# Patient Record
Sex: MALE | Race: WHITE | NOT HISPANIC OR LATINO | Employment: OTHER | URBAN - METROPOLITAN AREA
[De-identification: names, ages, dates, MRNs, and addresses within clinical notes are randomized per-mention and may not be internally consistent; named-entity substitution may affect disease eponyms.]

---

## 2023-05-19 ENCOUNTER — OFFICE VISIT (OUTPATIENT)
Dept: URGENT CARE | Facility: CLINIC | Age: 83
End: 2023-05-19
Payer: MEDICARE

## 2023-05-19 VITALS
HEART RATE: 54 BPM | BODY MASS INDEX: 20.04 KG/M2 | SYSTOLIC BLOOD PRESSURE: 128 MMHG | DIASTOLIC BLOOD PRESSURE: 68 MMHG | HEIGHT: 70 IN | TEMPERATURE: 97 F | OXYGEN SATURATION: 95 % | RESPIRATION RATE: 16 BRPM | WEIGHT: 140 LBS

## 2023-05-19 DIAGNOSIS — R53.1 GENERALIZED WEAKNESS: Primary | ICD-10-CM

## 2023-05-19 DIAGNOSIS — R82.4 KETONURIA: ICD-10-CM

## 2023-05-19 DIAGNOSIS — R23.0 PERIPHERAL CYANOSIS: ICD-10-CM

## 2023-05-19 DIAGNOSIS — R00.1 BRADYCARDIA: ICD-10-CM

## 2023-05-19 DIAGNOSIS — Z87.440 HISTORY OF RECURRENT UTI (URINARY TRACT INFECTION): ICD-10-CM

## 2023-05-19 DIAGNOSIS — R19.7 ACUTE DIARRHEA: ICD-10-CM

## 2023-05-19 DIAGNOSIS — M62.81 ABNORMAL GAIT DUE TO MUSCLE WEAKNESS: ICD-10-CM

## 2023-05-19 DIAGNOSIS — R53.81 DECLINING FUNCTIONAL STATUS: ICD-10-CM

## 2023-05-19 DIAGNOSIS — R30.0 DYSURIA: ICD-10-CM

## 2023-05-19 DIAGNOSIS — R53.83 FATIGUE, UNSPECIFIED TYPE: ICD-10-CM

## 2023-05-19 DIAGNOSIS — R53.81 PHYSICAL DECONDITIONING: ICD-10-CM

## 2023-05-19 DIAGNOSIS — E46 MALNUTRITION, UNSPECIFIED TYPE: ICD-10-CM

## 2023-05-19 DIAGNOSIS — R26.9 ABNORMAL GAIT DUE TO MUSCLE WEAKNESS: ICD-10-CM

## 2023-05-19 DIAGNOSIS — Z86.2 HISTORY OF ANEMIA: ICD-10-CM

## 2023-05-19 DIAGNOSIS — E88.89 KETOSIS: ICD-10-CM

## 2023-05-19 DIAGNOSIS — D50.8 IRON DEFICIENCY ANEMIA SECONDARY TO INADEQUATE DIETARY IRON INTAKE: ICD-10-CM

## 2023-05-19 LAB
BILIRUB UR QL STRIP: NEGATIVE
GLUCOSE UR QL STRIP: NEGATIVE
HGB, POC: 11.7 G/DL (ref 14–18)
KETONES UR QL STRIP: POSITIVE
LEUKOCYTE ESTERASE UR QL STRIP: NEGATIVE
PH, POC UA: 7
POC BLOOD, URINE: NEGATIVE
POC NITRATES, URINE: NEGATIVE
PROT UR QL STRIP: NEGATIVE
SP GR UR STRIP: 1 (ref 1–1.03)
UROBILINOGEN UR STRIP-ACNC: NORMAL (ref 0.3–2.2)

## 2023-05-19 PROCEDURE — 81003 URINALYSIS AUTO W/O SCOPE: CPT | Mod: QW,S$GLB,, | Performed by: NURSE PRACTITIONER

## 2023-05-19 PROCEDURE — 81003 POCT URINALYSIS, DIPSTICK, AUTOMATED, W/O SCOPE: ICD-10-PCS | Mod: QW,S$GLB,, | Performed by: NURSE PRACTITIONER

## 2023-05-19 PROCEDURE — 99204 OFFICE O/P NEW MOD 45 MIN: CPT | Mod: S$GLB,,, | Performed by: NURSE PRACTITIONER

## 2023-05-19 PROCEDURE — 93005 EKG 12-LEAD: ICD-10-PCS | Mod: S$GLB,,, | Performed by: NURSE PRACTITIONER

## 2023-05-19 PROCEDURE — 99204 PR OFFICE/OUTPT VISIT, NEW, LEVL IV, 45-59 MIN: ICD-10-PCS | Mod: S$GLB,,, | Performed by: NURSE PRACTITIONER

## 2023-05-19 PROCEDURE — 93010 EKG 12-LEAD: ICD-10-PCS | Mod: S$GLB,,, | Performed by: INTERNAL MEDICINE

## 2023-05-19 PROCEDURE — 93010 ELECTROCARDIOGRAM REPORT: CPT | Mod: S$GLB,,, | Performed by: INTERNAL MEDICINE

## 2023-05-19 PROCEDURE — 85018 HEMOGLOBIN: CPT | Mod: QW,,, | Performed by: NURSE PRACTITIONER

## 2023-05-19 PROCEDURE — 85018 POCT HEMOGLOBIN: ICD-10-PCS | Mod: QW,,, | Performed by: NURSE PRACTITIONER

## 2023-05-19 PROCEDURE — 93005 ELECTROCARDIOGRAM TRACING: CPT | Mod: S$GLB,,, | Performed by: NURSE PRACTITIONER

## 2023-05-19 RX ORDER — SIMVASTATIN 20 MG/1
TABLET, FILM COATED ORAL
COMMUNITY
Start: 2023-05-15

## 2023-05-19 RX ORDER — DIPHENOXYLATE HYDROCHLORIDE AND ATROPINE SULFATE 2.5; .025 MG/1; MG/1
1 TABLET ORAL 4 TIMES DAILY PRN
COMMUNITY
Start: 2023-05-18

## 2023-05-19 NOTE — PROGRESS NOTES
"Subjective:      Patient ID: Terrance Levin is a 82 y.o. male.    Vitals:  height is 5' 10" (1.778 m) and weight is 63.5 kg (140 lb). His temperature is 97.3 °F (36.3 °C). His blood pressure is 128/68 and his pulse is 54 (abnormal). His respiration is 16 and oxygen saturation is 95%.     Chief Complaint: Dysuria      Pt presents to urgent care clinic today accompanied by his wife. He is in [place] visiting family; he resides in [place]. Wife states she brought him in today to be checked for UTI since "I think he has another UTI because he's been having diarrhea and he's been sleeping a lot lately. He always gets like that when he has a UTI". Pt denies current dysuria, frequency, hematuria, or flank pain.  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  Last pcp records reveal prescribed daily prophylactic antibiotic, Macrodantin 50 mg daily, however patient and wife deny current daily abx use. Prior records review reveals past prescriptions for Ferrous Sulfate and furosemide which he denies actively taking.    C/o watery stools for the past week; averaging 1-2 diarrhea stools daily. He does report sufficient po water intake of ~72 oz daily. He denies poor appetite, however he has not eaten solid foods in the past 1-2 weeks as his wife has been serving him a liquid diet, mainly broth "because of the diarrhea" .  Pt reports generalized weakness, lightheadedness, dizziness which is more pronounced with positional changes. His physical condition has acutely progressed with new loss of functional independence whereas he is no longer able to ambulate without assistance as of the past 1-2 days.   He denies fever, vomiting, abdominal pain, rectal bleeding/epistaxis, CP, dyspnea.  He reports "wet" cough for the past 2 months " which is infrequently productive of white/frothy sputum without presence of hemoptysis.  Wife reports ~40# weight loss in the past 6 months.      Dysuria   This is a new problem. The current episode started 1 to 4 weeks ago. The problem occurs intermittently. The problem has been gradually worsening. Quality: denies pain. The pain is at a severity of 0/10. The patient is experiencing no pain. There has been no fever. He is Not sexually active. There is No history of pyelonephritis. Associated symptoms include chills, frequency, urgency and weight loss. Pertinent negatives include no flank pain, hematuria, nausea, sweats, vomiting or rash. He has tried nothing for the symptoms. His past medical history is significant for recurrent UTIs.     Constitution: Positive for activity change, chills, fatigue, unexpected weight change and generalized weakness. Negative for appetite change and fever.        Wife reports ~40# weight loss in past 6 months   HENT:  Negative for ear pain, tinnitus, hearing loss, mouth sores, congestion, nosebleeds, sinus pressure, sore throat, trouble swallowing and voice change.    Neck: Negative for neck pain, neck stiffness, painful lymph nodes and neck swelling.   Cardiovascular:  Negative for chest pain, leg swelling, palpitations, sob on exertion and passing out.   Eyes:  Negative for vision loss, double vision and blurred vision.   Respiratory:  Positive for cough and sputum production. Negative for chest tightness, bloody sputum, shortness of breath and wheezing.         Wet cough x 2 months  Infrequently productive of frothy white sputum   Denies dyspnea on exertion or at rest  Denies orthopnea, however he reports sleeping on more pillows lately from 1 to ~4 pillows.  Most recent BNP of 1128 on 2/28/23, however he denies PmHx CHF   Gastrointestinal:  Positive for diarrhea. Negative for abdominal pain, abdominal bloating, nausea, vomiting, bright red blood in stool, dark colored stools,  rectal bleeding and rectal pain.   Genitourinary:  Positive for dysuria, frequency, urgency and bladder incontinence. Negative for urine decreased, flank pain, hematuria, testicular pain and pelvic pain.   Musculoskeletal:  Positive for muscle ache. Negative for pain, joint pain, joint swelling and pain with walking.   Skin:  Negative for color change, pale, rash, wound and bruising.   Neurological:  Positive for dizziness, light-headedness, loss of balance and tremors. Negative for history of vertigo, passing out, speech difficulty, headaches, disorientation, altered mental status, loss of consciousness and seizures.   Hematologic/Lymphatic: Negative for swollen lymph nodes and easy bruising/bleeding. Does not bruise/bleed easily.   Psychiatric/Behavioral:  Negative for altered mental status, disorientation, confusion, agitation and nervous/anxious. The patient is not nervous/anxious.     Objective:     Physical Exam   Constitutional: He is oriented to person, place, and time. He appears cachectic. He is cooperative.  Non-toxic appearance. No distress. awake  HENT:   Head: Normocephalic and atraumatic.   Ears:   Right Ear: External ear normal.   Left Ear: External ear normal.   Nose: Nose normal.   Mouth/Throat: Mucous membranes are dry. No oropharyngeal exudate or posterior oropharyngeal erythema.   Eyes: Pupils are equal, round, and reactive to light. Right eye exhibits no discharge. Left eye exhibits no discharge. No scleral icterus. Extraocular movement intact      Comments: Conjunctival pallor bilaterally   Neck: Neck supple.      Comments: R jaw/neck deformity 2/2 prior tumor excision No neck rigidity present.   Cardiovascular: Regular rhythm. Bradycardia present.   No murmur heard.     Comments: Distant heart sounds noted   Pulmonary/Chest: No accessory muscle usage. No tachypnea and no bradypnea. No respiratory distress. He has decreased breath sounds. He has no wheezes. He has no rales.         Comments:  Shallow respirations noted  Wet cough present without sputum production. Pt is unable to expectorate sputum    Abdominal: Bowel sounds are normal. He exhibits no distension and no mass. Soft. flat abdomen There is no abdominal tenderness. There is no guarding, no left CVA tenderness and no right CVA tenderness.   Musculoskeletal:         General: No swelling or deformity.      Cervical back: He exhibits no tenderness.      Right lower leg: No edema.      Left lower leg: No edema.   Lymphadenopathy:     He has no cervical adenopathy.   Neurological: no focal deficit. He is alert, oriented to person, place, and time and at baseline. He displays weakness and tremor. He displays facial symmetry.      Comments: Gait not tested due to reported unsteadiness.  Wife confirms mental status is at baseline   Skin: Skin is dry, not diaphoretic, pale and no rash. Capillary refill takes 2 to 3 seconds. No bruising cyanosis         Comments: Skin cool to touch  Bluish/purple nail beds jaundice  Psychiatric: His behavior is normal. Mood, judgment and thought content normal. Cognition normal  Nursing note and vitals reviewed.chaperone present (wife present at bedside)     Assessment:     1. Generalized weakness    2. Iron deficiency anemia secondary to inadequate dietary iron intake    3. Malnutrition, unspecified type    4. Ketonuria    5. Acute diarrhea    6. Declining functional status    7. Bradycardia    8. Peripheral cyanosis    9. Abnormal gait due to muscle weakness    10. Physical deconditioning    11. Fatigue, unspecified type    12. History of recurrent UTI (urinary tract infection)    13. History of anemia    14. Dysuria    15. Ketosis        Plan:       Generalized weakness  -     Urine culture  -     POCT HEMOGLOBIN  -     IN OFFICE EKG 12-LEAD (to [place])  -     Refer to Emergency Dept.    Iron deficiency anemia secondary to inadequate dietary iron intake    Malnutrition, unspecified type    Ketonuria    Acute  diarrhea  -     Refer to Emergency Dept.    Declining functional status    Bradycardia  -     IN OFFICE EKG 12-LEAD (to [place])  -     Refer to Emergency Dept.    Peripheral cyanosis    Abnormal gait due to muscle weakness    Physical deconditioning  -     Refer to Emergency Dept.    Fatigue, unspecified type  -     IN OFFICE EKG 12-LEAD (to [place])  -     Refer to Emergency Dept.    History of recurrent UTI (urinary tract infection)  -     Urine culture  -     Refer to Emergency Dept.    History of anemia  -     POCT HEMOGLOBIN  -     Refer to Emergency Dept.    Dysuria  -     POCT Urinalysis, Dipstick, Automated, W/O Scope  -     Urine culture    Ketosis        Office Visit on 05/19/2023   Component Date Value Ref Range Status    POC Blood, Urine 05/19/2023 Negative  Negative Final    POC Bilirubin, Urine 05/19/2023 Negative  Negative Final    POC Urobilinogen, Urine 05/19/2023 normal  0.3 - 2.2 Final    POC Ketones, Urine 05/19/2023 Positive (A)  Negative Final    POC Protein, Urine 05/19/2023 Negative  Negative Final    POC Nitrates, Urine 05/19/2023 Negative  Negative Final    POC Glucose, Urine 05/19/2023 Negative  Negative Final    pH, UA 05/19/2023 7.0   Final    POC Specific Gravity, Urine 05/19/2023 1.005  1.003 - 1.029 Final    POC Leukocytes, Urine 05/19/2023 Negative  Negative Final    Hemoglobin 05/19/2023 11.7 (A)  14 - 18 g/dL Final       Medical Decision Making:   History:   I obtained history from: someone other than patient.       <> Summary of History: Wife provides minimal historical data. Majority of historical information was obtained from prior records review via EMR.  Old Records Summarized: records from clinic visits, records from previous admission(s) and other records.       <> Summary of Records: Reviewed most recent pcp visit notes from 4/17/23 as well as most recent lab results from 2/28/23 which reveal:   H/H 8.6/25.8  Transferrin 130, % sat 8, Iron 15  BNP 1,128  Vitamin D 25-OH  19.5  Vitamin B 12 1802  BUN/Scr 14/0.79  GFR 90  Last PCP visit VS 4/17/23: 122/70, HR 68, O2 Sat 98%  Differential Diagnosis:   Hypovolemia 2/2 diarrhea, metabolic acidosis, fasting ketoacidosis, lactic acidosis,hypokalemia,hypomagnesemia,hypophosphatemia, ARF, protein calorie malnutrition,   Independently Interpreted Test(s):   I have ordered and independently interpreted EKG Reading(s) - see summary below       <> Summary of EKG Reading(s): EKG: normal EKG, normal sinus rhythm, no prior available for comparison. Vent rate of 71 BPM  Clinical Tests:   Lab Tests: Ordered and Reviewed  The following lab test(s) were unremarkable: Urinalysis       <> Summary of Lab: POCT u/a: negative except for +ketones  Urine culture ordered given his past history of frequent UTIs requiring hospitalization. Initial urine specimen quantity insufficient. Waited some time for recollection; ~100 cc urine dark yellow produced but unable to send to lab due to specimen stability requirements (next   not until Monday).  Hemocue result: Hgb 11.7    Medical Tests: Ordered and Reviewed  Urgent Care Management:  I suspect pt's current complaint and condition is resulting from acute fluid and/or electrolyte imbalance 2/2 recent GI losses (diarrhea) complicated by undernutrition and increased energy expenditure 2/2 persistent involuntary tremor. He has an acute decline in functional status requiring ambulatory assistance due to progressive generalized muscle weakness. Review of most recent available lab results shows hypokalemia, Vitamin D deficiency, and iron deficiency anemia.  For these reasons, I recommend higher level of care via ER referral for evaluation and ordering of the appropriate diagnostic and laboratory tests with high likelihood for admission.  Of additional concern are his physical exam findings including peripheral cyanosis, cold extremities, increased capillary refill time, skin and conjunctival pallor despite  normal Oxygen saturation via pulse oximetry and absence of accessory muscle usage and respiratory distress.         Other:   I have discussed this case with another health care provider.       <> Summary of the Discussion: ER staff notified of pt's condition, urgent care results and clinical suspicions warranting ER workup.  Clinical exam findings,results, and diagnosis reviewed and discussed with patient and wife. Explained need for further testing/treatment via ED. Pt and wife verbalized understanding and are in agreement with this plan. All questions were answered and report given to ER triage RN. Pt stable and discharged in the care of wife who will transport pt directly to [facility] Emergency department.           Patient Instructions   Please follow up with your primary care provider within 2-5 days.  Please go directly to [facility] Emergency Department for  evaluation, diagnostic/lab testing to further investigate the cause for your symptoms.  We will communicate your condition to the ER staff who will be expecting your arrival.      You must understand that you have received an Urgent Care treatment only and that you may be released before all of your medical problems are known or treated. You, the patient, will arrange for follow up care as instructed.

## 2023-05-20 NOTE — PATIENT INSTRUCTIONS
Please follow up with your primary care provider within 2-5 days.  Please go directly to [facility] Emergency Department for  evaluation, diagnostic/lab testing to further investigate the cause for your symptoms.  We will communicate your condition to the ER staff who will be expecting your arrival.      You must understand that you have received an Urgent Care treatment only and that you may be released before all of your medical problems are known or treated. You, the patient, will arrange for follow up care as instructed.

## 2023-05-22 ENCOUNTER — TELEPHONE (OUTPATIENT)
Dept: URGENT CARE | Facility: CLINIC | Age: 83
End: 2023-05-22
Payer: MEDICARE

## 2023-05-24 ENCOUNTER — TELEPHONE (OUTPATIENT)
Dept: URGENT CARE | Facility: CLINIC | Age: 83
End: 2023-05-24
Payer: MEDICARE